# Patient Record
(demographics unavailable — no encounter records)

---

## 2025-06-24 NOTE — PHYSICAL EXAM
[Normal Appearance] : normal appearance [Well Groomed] : well groomed [General Appearance - In No Acute Distress] : no acute distress [Edema] : no peripheral edema [Respiration, Rhythm And Depth] : normal respiratory rhythm and effort [Exaggerated Use Of Accessory Muscles For Inspiration] : no accessory muscle use [Abdomen Soft] : soft [Abdomen Tenderness] : non-tender [Urinary Bladder Findings] : the bladder was normal on palpation [Normal Station and Gait] : the gait and station were normal for the patient's age [] : no rash [No Focal Deficits] : no focal deficits [Oriented To Time, Place, And Person] : oriented to person, place, and time [Affect] : the affect was normal [Mood] : the mood was normal [No Palpable Adenopathy] : no palpable adenopathy

## 2025-06-24 NOTE — ASSESSMENT
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  - Patient will send PSA to office for review - CT chest and abdomen in 2 years - F/u following imaging

## 2025-06-24 NOTE — HISTORY OF PRESENT ILLNESS
[FreeTextEntry1] : 69 yo male s/p R partial nephrectomy in 2020 (RCC pT3a FG2) presenting for follow up.  CT chest/abdomen in June 2024 showed no evidence of recurrence. TYE today shows no recurrence in R kidney, and larger cyst in L upper pole as well as new simple cyst in L lateral midpole.  PSA in June 2024 was 4.07 ng/mL.  Nocturia 0-1x/night. No straining, bladder feels empty after voiding.